# Patient Record
(demographics unavailable — no encounter records)

---

## 2024-12-12 NOTE — REASON FOR VISIT
Normal hemoglobin,  family aware [Follow-Up - Clinic] : a clinic follow-up of [Hyperlipidemia] : hyperlipidemia [Palpitations] : palpitations [FreeTextEntry2] : had mild godfrey past year, s/p prostate ca, claims when carries something gets occasional dyspnea, heart palp/flutter, stable, no new sx, s/p covid 3 weeks ago [FreeTextEntry1] : prior episode of  palpitations with blury vision, resolved, occas palp

## 2024-12-12 NOTE — PHYSICAL EXAM
[General Appearance - Well Developed] : well developed [Normal Appearance] : normal appearance [Well Groomed] : well groomed [General Appearance - Well Nourished] : well nourished [No Deformities] : no deformities [General Appearance - In No Acute Distress] : no acute distress [Normal Conjunctiva] : the conjunctiva exhibited no abnormalities [Eyelids - No Xanthelasma] : the eyelids demonstrated no xanthelasmas [Normal Oral Mucosa] : normal oral mucosa [No Oral Pallor] : no oral pallor [No Oral Cyanosis] : no oral cyanosis [Normal Jugular Venous A Waves Present] : normal jugular venous A waves present [Normal Jugular Venous V Waves Present] : normal jugular venous V waves present [No Jugular Venous Rogers A Waves] : no jugular venous rogers A waves [Abdomen Soft] : soft [Abdomen Tenderness] : non-tender [Abdomen Mass (___ Cm)] : no abdominal mass palpated [Abnormal Walk] : normal gait [Gait - Sufficient For Exercise Testing] : the gait was sufficient for exercise testing [Nail Clubbing] : no clubbing of the fingernails [Cyanosis, Localized] : no localized cyanosis [Petechial Hemorrhages (___cm)] : no petechial hemorrhages [Skin Color & Pigmentation] : normal skin color and pigmentation [] : no rash [No Venous Stasis] : no venous stasis [Skin Lesions] : no skin lesions [No Skin Ulcers] : no skin ulcer [No Xanthoma] : no  xanthoma was observed [Oriented To Time, Place, And Person] : oriented to person, place, and time [Affect] : the affect was normal [Mood] : the mood was normal [No Anxiety] : not feeling anxious [Normal Rate] : normal [Normal S1] : normal S1 [Normal S2] : normal S2 [S3] : no S3 [S4] : no S4 [No Murmur] : no murmurs heard [Right Carotid Bruit] : no bruit heard over the right carotid [Left Carotid Bruit] : no bruit heard over the left carotid [Right Femoral Bruit] : no bruit heard over the right femoral artery [Left Femoral Bruit] : no bruit heard over the left femoral artery [2+] : left 2+ [No Abnormalities] : the abdominal aorta was not enlarged and no bruit was heard [No Pitting Edema] : no pitting edema present

## 2024-12-12 NOTE — DISCUSSION/SUMMARY
[Hyperlipidemia] : hyperlipidemia [Lipids Test Panel] : a fasting lipid profile [Multidetector Cardiac CT] : a cardiac multidetector CT [Known CAD] : known coronary artery disease [Asthma] : asthma [Stable] : stable [Echocardiogram] : an echocardiogram [de-identified] : coronary calcium on abd ct, f/u cardiac cta [de-identified] : associated with blury vision and near syncope in past, not recurrent with increased salt [de-identified] : intermittent asthma, small pericardial effusion on prior echo, also seen on recent abdom ct with cor calcium , consider f/u cardiac cta

## 2025-01-21 NOTE — PHYSICAL EXAM
[2+] : left 2+ [Ankle Swelling (On Exam)] : present [Ankle Swelling Bilaterally] : bilaterally  [Varicose Veins Of Lower Extremities] : not present [] : not present [de-identified] : alert and oriented

## 2025-01-21 NOTE — HISTORY OF PRESENT ILLNESS
[FreeTextEntry1] : This is a 74-year-old male with history of hypertension, hyperlipidemia who presents for second opinion bilateral lower extremity edema.  He was evaluated by another physician and was told he needs ablations bilaterally.  He notices the swelling is worse when he gains weight and improves after exercise and weight loss.  He wears compression stockings intermittently which also helps.  He denies history of DVT or prior vein treatments.

## 2025-01-21 NOTE — PHYSICAL EXAM
[2+] : left 2+ [Ankle Swelling (On Exam)] : present [Ankle Swelling Bilaterally] : bilaterally  [Varicose Veins Of Lower Extremities] : not present [] : not present [de-identified] : alert and oriented

## 2025-01-21 NOTE — PLAN
[TextEntry] : 74-year-old male with bilateral lower extremity dependent edema.  Symptoms improved with exercise, compression stockings, leg elevation.  Bilateral ultrasound today does not show any venous insufficiency.  Recommend continued compression stockings and leg elevation.  No vascular intervention indicated.

## 2025-01-23 NOTE — HISTORY OF PRESENT ILLNESS
[FreeTextEntry1] : 73yo gentleman with cc of prostate ca. Pt went to see PCP for annual exam and had PSA of 6. He was started on abx for 2 weeks and had recheck and PSA was 10. PSAs had been past have been in the 2 range. Mild luts. Father and paternal uncle with hx of prostate ca. Both dx with prostate ca in their 50s/60s and both treated with surgery. Both passed away from prostate ca at 82 and in 70s respectively. VICKI showed no discrete nodules but firm. Pt underwent bx which revealed Chapo 4+4=8 disease in 8/12 cores with B disease. Plan was made for metastatic eval. Pt underwent CT that showed no LAD. CT and bone scan showed L sclerotic bone focus ? bone island vs met. CXR was negative. MRI of L sclerotic lesion was negative. Pt also underwent MRI prostate for eval and there was 3 suspicious lesions, one with possible concern for QUYEN and another with possible concern for SVI. He was discussed at tumor board and both surgery and radiation were considered options. Pt elected radiation. He was taking biclutamide and Lupron. XRT and brachy were completed.  His last lupron injection was in APril 2017 and he completed 2y of ADT.   Pt returns today for follow-up. He is overall feeling well. PSA remains undetectable. Using cialis for ED which is more significant now. Found variable efficacy and was changed to viagra at last visit 6mo ago with Dr Seals. He did not find this helpful and wants to switch back. He is not as sexually active. Wife with a lot of medical issues. Nocturia is 0-1.  Having a little more urgency than in the past.   AS ABOVE WITH DR. 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 PSA undetectable (1/2024)- rechecked 1/2025, again undetectable. Taking Cialis 20mg PRN with good results. Pt denies any urinary complaints- no dysuria, no blood in urine or UTIs.  No urinary symptoms. Cialis working well for erectile dysfunction.

## 2025-01-23 NOTE — PHYSICAL EXAM
[Normal] : oriented to person, place and time, the affect was normal, the mood was normal and not anxious [FreeTextEntry1] : 75 yo M with prostate cancer  #Prostate cancer - Plan for PSA today - We discussed the fact that PSA is a nonspecific test for cancer although it is prostate specific. We have reviewed the fact that elevations can be caused by multiple separate processes with the prostate including prostate cancer, benign prostate enlargement, prostate inflammation or infection, or combination of any of the above. We also reviewed that procedures involving catheterizations or manipulation of the prostate including colonoscopy could cause PSA to be elevated. I also have reviewed with the patient that there is age specificity related to PSA and that over time there is some expectation that the PSA will slowly rise over time - Call with results - RPA 1 year with Dr. Mosquera

## 2025-01-23 NOTE — REVIEW OF SYSTEMS
[Negative] : Allergic/Immunologic [Anal Pain: Grade 0] : Anal Pain: Grade 0 [Constipation: Grade 0] : Constipation: Grade 0 [Diarrhea: Grade 0] : Diarrhea: Grade 0 [Dyspepsia: Grade 0] : Dyspepsia: Grade 0 [Dysphagia: Grade 0] : Dysphagia: Grade 0 [Esophagitis: Grade 0] : Esophagitis: Grade 0 [Fecal Incontinence: Grade 0] : Fecal Incontinence: Grade 0 [Gastroparesis: Grade 0] : Gastroparesis: Grade 0 [Nausea: Grade 0] : Nausea: Grade 0 [Proctitis: Grade 0] : Proctitis: Grade 0 [Rectal Pain: Grade 0] : Rectal Pain: Grade 0 [Small Intestinal Obstruction: Grade 0] : Small Intestinal Obstruction: Grade 0 [Vomiting: Grade 0] : Vomiting: Grade 0 [Hematuria: Grade 0] : Hematuria: Grade 0 [Urinary Incontinence: Grade 0] : Urinary Incontinence: Grade 0  [Urinary Retention: Grade 0] : Urinary Retention: Grade 0 [Urinary Tract Pain: Grade 0] : Urinary Tract Pain: Grade 0 [Urinary Urgency: Grade 0] : Urinary Urgency: Grade 0 [Urinary Frequency: Grade 0] : Urinary Frequency: Grade 0 [Ejaculation Disorder: Grade 0] : Ejaculation Disorder: Grade 0 [Erectile Dysfunction: Grade 0] : Erectile Dysfunction: Grade 0

## 2025-06-05 NOTE — PHYSICAL EXAM
[General Appearance - Well Developed] : well developed [Normal Appearance] : normal appearance [Well Groomed] : well groomed [General Appearance - Well Nourished] : well nourished [No Deformities] : no deformities [General Appearance - In No Acute Distress] : no acute distress [Normal Conjunctiva] : the conjunctiva exhibited no abnormalities [Eyelids - No Xanthelasma] : the eyelids demonstrated no xanthelasmas [Normal Oral Mucosa] : normal oral mucosa [No Oral Pallor] : no oral pallor [No Oral Cyanosis] : no oral cyanosis [Normal Jugular Venous A Waves Present] : normal jugular venous A waves present [Normal Jugular Venous V Waves Present] : normal jugular venous V waves present [No Jugular Venous Rogesr A Waves] : no jugular venous rogers A waves [Abdomen Soft] : soft [Abdomen Tenderness] : non-tender [Abdomen Mass (___ Cm)] : no abdominal mass palpated [Abnormal Walk] : normal gait [Gait - Sufficient For Exercise Testing] : the gait was sufficient for exercise testing [Nail Clubbing] : no clubbing of the fingernails [Cyanosis, Localized] : no localized cyanosis [Petechial Hemorrhages (___cm)] : no petechial hemorrhages [Skin Color & Pigmentation] : normal skin color and pigmentation [] : no rash [No Venous Stasis] : no venous stasis [Skin Lesions] : no skin lesions [No Skin Ulcers] : no skin ulcer [No Xanthoma] : no  xanthoma was observed [Oriented To Time, Place, And Person] : oriented to person, place, and time [Affect] : the affect was normal [Mood] : the mood was normal [No Anxiety] : not feeling anxious [Normal Rate] : normal [Normal S1] : normal S1 [Normal S2] : normal S2 [S3] : no S3 [S4] : no S4 [No Murmur] : no murmurs heard [Right Carotid Bruit] : no bruit heard over the right carotid [Left Carotid Bruit] : no bruit heard over the left carotid [Right Femoral Bruit] : no bruit heard over the right femoral artery [Left Femoral Bruit] : no bruit heard over the left femoral artery [2+] : left 2+ [No Abnormalities] : the abdominal aorta was not enlarged and no bruit was heard [No Pitting Edema] : no pitting edema present

## 2025-06-05 NOTE — REASON FOR VISIT
[Follow-Up - Clinic] : a clinic follow-up of [Hyperlipidemia] : hyperlipidemia [Palpitations] : palpitations [FreeTextEntry2] : had mild godfrey past year, s/p prostate ca, claims when carries something gets occasional dyspnea, heart palp/flutter, stable, no new sx [FreeTextEntry1] : prior episode of  palpitations with blury vision, resolved, occas palp

## 2025-06-18 NOTE — PHYSICAL EXAM
[Sclera] : the sclera and conjunctiva were normal [Edema] : no peripheral edema present [Abdomen Soft] : soft [Nondistended] : nondistended [Abdomen Tenderness] : non-tender [] : no rash [No Focal Deficits] : no focal deficits [Normal] : oriented to person, place and time, the affect was normal, the mood was normal and not anxious

## 2025-06-18 NOTE — REVIEW OF SYSTEMS
[IPSS Score (0-40): ___] : IPSS score: [unfilled] [EPIC-CP Score (0-60): ___] : EPIC-CP score: [unfilled] [Negative] : Allergic/Immunologic [Anal Pain: Grade 0] : Anal Pain: Grade 0 [Constipation: Grade 0] : Constipation: Grade 0 [Hematuria: Grade 0] : Hematuria: Grade 0 [Urinary Incontinence: Grade 0] : Urinary Incontinence: Grade 0  [Urinary Retention: Grade 0] : Urinary Retention: Grade 0 [Urinary Tract Pain: Grade 0] : Urinary Tract Pain: Grade 0 [Urinary Urgency: Grade 0] : Urinary Urgency: Grade 0 [Urinary Frequency: Grade 0] : Urinary Frequency: Grade 0

## 2025-06-18 NOTE — HISTORY OF PRESENT ILLNESS
[FreeTextEntry1] : Mr. 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 He received androgen deprivation therapy in conjunction with combination radiation therapy: external beam radiation therapy to 45 Gy followed by a low-dose rate brachytherapy boost, completing therapy on 11/11/15. He returns today for routine follow up.  He is feeling generally well. He is involved in all his normal activities. He denies pain or discomfort. He is urinating normally, offers no specific complaints. He has no dysuria, hematuria or incontinence. The bowel movements are regular. He has erectile dysfunction but has not had much interest lately.  He continues to follow with Dr. Cabrera.  On 1/21/2025 PSA <0.01ng/ml was <0.01 on 6/19/2021

## 2025-06-25 NOTE — ASSESSMENT
[No evidence of disease] : No evidence of disease Topical Clindamycin Pregnancy And Lactation Text: This medication is Pregnancy Category B and is considered safe during pregnancy. It is unknown if it is excreted in breast milk.

## 2025-06-26 NOTE — HISTORY OF PRESENT ILLNESS
[FreeTextEntry1] : Mr. 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 He received androgen deprivation therapy in conjunction with combination radiation therapy: external beam radiation therapy to 45 Gy followed by a low-dose rate brachytherapy boost, completing therapy on 11/11/15. He returns today for routine follow up.  He is feeling generally well. He is involved in all his normal activities. He denies pain or discomfort. He is urinating normally. He reports occasional incontinence since last seen, twice in the last year. The incidents may have been related to drinking a lot of water, hearing water flowing from a faucet, and/or certain types of beverages like coffee. He has no dysuria, hematuria or weak stream, frequency is stable and nocturia is 1 - 2x. The bowel movements are regular. He has erectile dysfunction but has not had much interest lately.

## 2025-06-26 NOTE — HISTORY OF PRESENT ILLNESS
[FreeTextEntry1] : Mr. 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 He received androgen deprivation therapy in conjunction with combination radiation therapy: external beam radiation therapy to 45 Gy followed by a low-dose rate brachytherapy boost, completing therapy on 11/11/15. He returns today for routine follow up.  He is feeling generally well. He is involved in all his normal activities. He denies pain or discomfort. He is urinating normally. He reports occasional incontinence since last seen, twice in the last year. The incidents may have been related to drinking a lot of water, hearing water flowing from a faucet, and/or certain types of beverages like coffee. He has no dysuria, hematuria or weak stream, frequency is stable and nocturia is 1 - 2x. The bowel movements are regular. He has erectile dysfunction but has not had much interest lately.

## 2025-06-26 NOTE — DATA REVIEWED
[FreeTextEntry1] : PSA (ng/mL)   04/27/16 <0.01 11/07/16 <0.01 04/04/17 <0.01 10/18/17 < 0.01 05/15/18 < 0.01 09/22/18  0.01 12/11/18  0.02 06/10/18  0.02 06/16/20  0.01 12/02/20 <0.01 06/16/21  0.02 12/14/21 <0.01 12/08/22 <0.01 06/13/23 <0.01 06/19/24 <0.01 01/21/25 <0.01 06/23/25 <0.01

## 2025-06-26 NOTE — REVIEW OF SYSTEMS
[Urinary Incontinence: Grade 1 - Occasional (e.g., with coughing, sneezing, etc.), pads not indicated] : Urinary Incontinence: Grade 1 - Occasional (e.g., with coughing, sneezing, etc.), pads not indicated [Urinary Frequency: Grade 1 - Present] : Urinary Frequency: Grade 1 - Present